# Patient Record
Sex: FEMALE | Race: WHITE | NOT HISPANIC OR LATINO | Employment: OTHER | URBAN - NONMETROPOLITAN AREA
[De-identification: names, ages, dates, MRNs, and addresses within clinical notes are randomized per-mention and may not be internally consistent; named-entity substitution may affect disease eponyms.]

---

## 2017-01-11 ENCOUNTER — OFFICE VISIT (OUTPATIENT)
Dept: CARDIOLOGY | Facility: CLINIC | Age: 82
End: 2017-01-11

## 2017-01-11 VITALS
HEIGHT: 65 IN | BODY MASS INDEX: 26.66 KG/M2 | SYSTOLIC BLOOD PRESSURE: 130 MMHG | OXYGEN SATURATION: 95 % | DIASTOLIC BLOOD PRESSURE: 60 MMHG | WEIGHT: 160 LBS | HEART RATE: 60 BPM

## 2017-01-11 DIAGNOSIS — I10 ESSENTIAL HYPERTENSION: ICD-10-CM

## 2017-01-11 DIAGNOSIS — I48.0 PAROXYSMAL ATRIAL FIBRILLATION (HCC): Primary | ICD-10-CM

## 2017-01-11 DIAGNOSIS — Z95.0 PACEMAKER: ICD-10-CM

## 2017-01-11 PROBLEM — F02.80 ALZHEIMER'S DEMENTIA (HCC): Status: ACTIVE | Noted: 2017-01-11

## 2017-01-11 PROBLEM — K21.9 GERD (GASTROESOPHAGEAL REFLUX DISEASE): Status: ACTIVE | Noted: 2017-01-11

## 2017-01-11 PROBLEM — G30.9 ALZHEIMER'S DEMENTIA (HCC): Status: ACTIVE | Noted: 2017-01-11

## 2017-01-11 PROCEDURE — 99213 OFFICE O/P EST LOW 20 MIN: CPT | Performed by: INTERNAL MEDICINE

## 2017-01-11 RX ORDER — GABAPENTIN 100 MG/1
100 CAPSULE ORAL TAKE AS DIRECTED
Refills: 5 | COMMUNITY
Start: 2016-12-08

## 2017-01-11 RX ORDER — MEMANTINE HYDROCHLORIDE 10 MG/1
10 TABLET ORAL 2 TIMES DAILY
Refills: 4 | COMMUNITY
Start: 2016-12-30

## 2017-01-11 RX ORDER — DONEPEZIL HYDROCHLORIDE 10 MG/1
10 TABLET, FILM COATED ORAL NIGHTLY
Refills: 4 | COMMUNITY
Start: 2016-12-17

## 2017-01-11 RX ORDER — SIMETHICONE 180 MG
180 CAPSULE ORAL 2 TIMES DAILY
COMMUNITY

## 2017-01-11 RX ORDER — WARFARIN SODIUM 2 MG/1
2 TABLET ORAL DAILY
Refills: 5 | COMMUNITY
Start: 2016-12-17

## 2017-01-11 RX ORDER — FUROSEMIDE 40 MG/1
40 TABLET ORAL DAILY
Refills: 2 | COMMUNITY
Start: 2016-12-29

## 2017-01-11 RX ORDER — QUETIAPINE FUMARATE 25 MG/1
50 TABLET, FILM COATED ORAL NIGHTLY
COMMUNITY
Start: 2017-01-08

## 2017-01-11 RX ORDER — SOTALOL HYDROCHLORIDE 120 MG/1
120 TABLET ORAL 2 TIMES DAILY
Refills: 4 | COMMUNITY
Start: 2016-12-25

## 2017-01-11 RX ORDER — METOPROLOL SUCCINATE 25 MG/1
25 TABLET, EXTENDED RELEASE ORAL 2 TIMES DAILY
Refills: 2 | COMMUNITY
Start: 2016-12-30

## 2017-01-11 RX ORDER — MEGESTROL ACETATE 40 MG/1
40 TABLET ORAL 2 TIMES DAILY
Refills: 2 | COMMUNITY
Start: 2017-01-06

## 2017-01-11 RX ORDER — SENNOSIDES 8.6 MG
650 CAPSULE ORAL 2 TIMES DAILY
COMMUNITY

## 2017-01-11 RX ORDER — OMEPRAZOLE 20 MG/1
20 CAPSULE, DELAYED RELEASE ORAL 2 TIMES DAILY
Refills: 5 | COMMUNITY
Start: 2017-01-02

## 2017-01-11 RX ORDER — POTASSIUM CHLORIDE 20 MEQ/1
20 TABLET, EXTENDED RELEASE ORAL DAILY
Refills: 5 | COMMUNITY
Start: 2016-12-14

## 2017-01-11 NOTE — LETTER
January 11, 2017     All Qureshi MD  [address] [place] [address]    Patient: Caitlin Xavier   YOB: 1928   Date of Visit: 1/11/2017       Dear Dr. Titus MD:    Caitlin Xavier was in my office today. Below is a copy of my note.    If you have questions, please do not hesitate to call me. I look forward to following Caitlin along with you.         Sincerely,        Anthony Krishnamurthy MD        CC: No Recipients      Reason for Visit: cardiovascular follow up.    HPI:  Caitlin Xavier is a 88 y.o. female is here today for 6 month follow-up.  She is a former patient of Dr. hart and transitioning care.    Previous Cardiac Testing and Procedures:  - Echo (05/16/2014) technically difficult study, EF 60%, mild AI    Patient Active Problem List   Diagnosis   • Paroxysmal atrial fibrillation   • Pacemaker   • Essential hypertension   • GERD (gastroesophageal reflux disease)       Social History   Substance Use Topics   • Smoking status: Never Smoker   • Smokeless tobacco: Never Used   • Alcohol use No       Family History   Problem Relation Age of Onset   • Heart disease Mother    • Hypertension Mother    • No Known Problems Father        The following portions of the patient's history were reviewed and updated as appropriate: allergies, current medications, past family history, past medical history, past social history, past surgical history and problem list.      Current Outpatient Prescriptions:   •  acetaminophen (TYLENOL) 650 MG 8 hr tablet, 650 mg 2 (Two) Times a Day., Disp: , Rfl:   •  Calcium Carb-Cholecalciferol (CALCIUM 600/VITAMIN D3 PO), Take  by mouth 2 (Two) Times a Day., Disp: , Rfl:   •  donepezil (ARICEPT) 10 MG tablet, 10 mg Every Night., Disp: , Rfl: 4  •  furosemide (LASIX) 40 MG tablet, 40 mg Daily. 1 and 1/2 tablet daily, Disp: , Rfl: 2  •  gabapentin (NEURONTIN) 100 MG capsule, 100 mg Take As Directed. 1 tablet am, and 2 pm, Disp: , Rfl: 5  •  megestrol (MEGACE) 40 MG tablet, 40 mg 2  "(Two) Times a Day., Disp: , Rfl: 2  •  memantine (NAMENDA) 10 MG tablet, 10 mg 2 (Two) Times a Day., Disp: , Rfl: 4  •  metoprolol succinate XL (TOPROL-XL) 25 MG 24 hr tablet, 25 mg 2 (Two) Times a Day., Disp: , Rfl: 2  •  Multiple Vitamin (MULTI VITAMIN DAILY PO), Take  by mouth Daily., Disp: , Rfl:   •  omeprazole (priLOSEC) 20 MG capsule, 20 mg 2 (Two) Times a Day., Disp: , Rfl: 5  •  potassium chloride (K-DUR,KLOR-CON) 20 MEQ CR tablet, 20 mEq Daily., Disp: , Rfl: 5  •  QUEtiapine (SEROquel) 25 MG tablet, 50 mg Every Night., Disp: , Rfl:   •  simethicone (MYLICON,GAS-X) 180 MG capsule, Take 180 mg by mouth 2 (Two) Times a Day., Disp: , Rfl:   •  sotalol (BETAPACE) 120 MG tablet, 120 mg 2 (Two) Times a Day., Disp: , Rfl: 4  •  warfarin (COUMADIN) 2 MG tablet, 2 mg Daily., Disp: , Rfl: 5    Review of Systems   Constitution: Positive for malaise/fatigue. Negative for chills, decreased appetite and fever.   HENT: Negative for congestion, headaches and nosebleeds.    Eyes: Negative for blurred vision and double vision.   Cardiovascular: Positive for leg swelling. Negative for chest pain, irregular heartbeat and palpitations.   Respiratory: Positive for cough. Negative for shortness of breath.    Hematologic/Lymphatic: Bruises/bleeds easily.   Skin: Negative for dry skin and rash.   Musculoskeletal: Positive for back pain. Negative for joint pain and neck pain.   Gastrointestinal: Negative for abdominal pain, constipation, heartburn and melena.   Genitourinary: Negative for dysuria and hematuria.   Neurological: Negative for dizziness, light-headedness and numbness.   Psychiatric/Behavioral: Negative for depression. The patient does not have insomnia and is not nervous/anxious.        Objective   Visit Vitals   • /60 (BP Location: Left arm, Patient Position: Sitting, Cuff Size: Adult)   • Pulse 60   • Ht 65\" (165.1 cm)   • Wt 160 lb (72.6 kg)  Comment: pt not able to stand   • SpO2 95%   • BMI 26.63 kg/m2 "     Physical Exam   Constitutional: She is oriented to person, place, and time. She appears well-developed and well-nourished.   HENT:   Head: Normocephalic and atraumatic.   Cardiovascular: Normal rate, regular rhythm and normal heart sounds.    No murmur heard.  Pulmonary/Chest: Effort normal and breath sounds normal.   Musculoskeletal: She exhibits no edema.   Neurological: She is alert and oriented to person, place, and time.   Skin: Skin is warm and dry.   Psychiatric: She has a normal mood and affect.     Procedures      ICD-10-CM ICD-9-CM   1. Paroxysmal atrial fibrillation I48.0 427.31   2. Pacemaker Z95.0 V45.01   3. Essential hypertension I10 401.9         Assessment/Plan:  1.  Paroxysmal atrial fibrillation: Managed on rhythm control with sotalol and Coumadin.    2.  Pacemaker in place: Initially placed for sick sinus syndrome, status post generator replacement June 2014.  Is due for pacemaker check.  Will schedule in pacemaker clinic.      3.  Hypertension: Well-controlled on current medications

## 2017-01-11 NOTE — MR AVS SNAPSHOT
Caitlin Xavier   1/11/2017 2:30 PM   Office Visit    Dept Phone:  [phone]   Encounter #:  [id number]    Provider:  Anthony Krishnamurthy MD   Department:  [facility]                Your Full Care Plan              Your Updated Medication List          This list is accurate as of: 1/11/17  3:41 PM.  Always use your most recent med list.                acetaminophen 650 MG 8 hr tablet   Commonly known as:  TYLENOL       CALCIUM 600/VITAMIN D3 PO       donepezil 10 MG tablet   Commonly known as:  ARICEPT       furosemide 40 MG tablet   Commonly known as:  LASIX       gabapentin 100 MG capsule   Commonly known as:  NEURONTIN       megestrol 40 MG tablet   Commonly known as:  MEGACE       memantine 10 MG tablet   Commonly known as:  NAMENDA       metoprolol succinate XL 25 MG 24 hr tablet   Commonly known as:  TOPROL-XL       MULTI VITAMIN DAILY PO       omeprazole 20 MG capsule   Commonly known as:  priLOSEC       potassium chloride 20 MEQ CR tablet   Commonly known as:  K-DUR,KLOR-CON       QUEtiapine 25 MG tablet   Commonly known as:  SEROquel       simethicone 180 MG capsule   Commonly known as:  MYLICON,GAS-X       sotalol 120 MG tablet   Commonly known as:  BETAPACE       warfarin 2 MG tablet   Commonly known as:  COUMADIN               You Were Diagnosed With        Codes Comments    Paroxysmal atrial fibrillation    -  Primary ICD-10-CM: I48.0  ICD-9-CM: 427.31     Pacemaker     ICD-10-CM: Z95.0  ICD-9-CM: V45.01     Essential hypertension     ICD-10-CM: I10  ICD-9-CM: 401.9       Instructions     None    Patient Instructions History      Upcoming Appointments     Visit Type Date Time Department    FOLLOW UP 1/11/2017  2:30 PM  [facility]    PACEMAKER CHECK 3/29/2017  2:15 PM [facility]    FOLLOW UP 7/13/2017  1:00 PM [facility]      [facility] Signup     [facility] allows you to send messages to your doctor, view your  "test results, renew your prescriptions, schedule appointments, and more. To sign up, go to [employer] and click on the Sign Up Now link in the New User? box. Enter your [employer] Activation Code exactly as it appears below along with the last four digits of your Social Security Number and your Date of Birth () to complete the sign-up process. If you do not sign up before the expiration date, you must request a new code.    [employer] Activation Code: [email or web address]  Expires: 2017  3:41 PM    If you have questions, you can email [email or web address] or call [phone] to talk to our [employer] staff. Remember, [employer] is NOT to be used for urgent needs. For medical emergencies, dial 911.               Other Info from Your Visit           Your Appointments     Mar 29, 2017  2:15 PM CDT   PACEMAKER CHECK with PACEMAKER CLINIC [facility] (--)    [address] [address] [phone]            2017  1:00 PM CDT   Follow Up with Anthony Krishnamurthy MD   [facility] (--)    [address] [address] [phone]           Arrive 15 minutes prior to appointment.              Allergies     Penicillins Allergy     Sulfa Antibiotics Allergy       Reason for Visit     Congestive Heart Failure     Hypertension     Atrial Fibrillation           Vital Signs     Blood Pressure Pulse Height Weight Oxygen Saturation Body Mass Index    130/60 (BP Location: Left arm, Patient Position: Sitting, Cuff Size: Adult) 60 65\" (165.1 cm) 160 lb (72.6 kg) 95% 26.63 kg/m2    Smoking Status                   Never Smoker           Problems and Diagnoses Noted     Alzheimer's disease    High blood pressure    Acid reflux disease    Pacemaker    Atrial fibrillation (irregular heartbeat)        "

## 2017-01-11 NOTE — PROGRESS NOTES
Reason for Visit: cardiovascular follow up.    HPI:  Caitlin Xavier is a 88 y.o. female is here today for 6 month follow-up.  She is a former patient of Dr. hart and transitioning care.  She has done well since her last visit with no current issues or complaints.  Her son notes that she spends most of the day sleeping.  Does not feel she has had her pacemaker interrogated in a while.    Previous Cardiac Testing and Procedures:  - Echo (05/16/2014) technically difficult study, EF 60%, mild AI    Patient Active Problem List   Diagnosis   • Paroxysmal atrial fibrillation   • Pacemaker   • Essential hypertension   • GERD (gastroesophageal reflux disease)       Social History   Substance Use Topics   • Smoking status: Never Smoker   • Smokeless tobacco: Never Used   • Alcohol use No       Family History   Problem Relation Age of Onset   • Heart disease Mother    • Hypertension Mother    • No Known Problems Father        The following portions of the patient's history were reviewed and updated as appropriate: allergies, current medications, past family history, past medical history, past social history, past surgical history and problem list.      Current Outpatient Prescriptions:   •  acetaminophen (TYLENOL) 650 MG 8 hr tablet, 650 mg 2 (Two) Times a Day., Disp: , Rfl:   •  Calcium Carb-Cholecalciferol (CALCIUM 600/VITAMIN D3 PO), Take  by mouth 2 (Two) Times a Day., Disp: , Rfl:   •  donepezil (ARICEPT) 10 MG tablet, 10 mg Every Night., Disp: , Rfl: 4  •  furosemide (LASIX) 40 MG tablet, 40 mg Daily. 1 and 1/2 tablet daily, Disp: , Rfl: 2  •  gabapentin (NEURONTIN) 100 MG capsule, 100 mg Take As Directed. 1 tablet am, and 2 pm, Disp: , Rfl: 5  •  megestrol (MEGACE) 40 MG tablet, 40 mg 2 (Two) Times a Day., Disp: , Rfl: 2  •  memantine (NAMENDA) 10 MG tablet, 10 mg 2 (Two) Times a Day., Disp: , Rfl: 4  •  metoprolol succinate XL (TOPROL-XL) 25 MG 24 hr tablet, 25 mg 2 (Two) Times a Day., Disp: , Rfl: 2  •  Multiple  "Vitamin (MULTI VITAMIN DAILY PO), Take  by mouth Daily., Disp: , Rfl:   •  omeprazole (priLOSEC) 20 MG capsule, 20 mg 2 (Two) Times a Day., Disp: , Rfl: 5  •  potassium chloride (K-DUR,KLOR-CON) 20 MEQ CR tablet, 20 mEq Daily., Disp: , Rfl: 5  •  QUEtiapine (SEROquel) 25 MG tablet, 50 mg Every Night., Disp: , Rfl:   •  simethicone (MYLICON,GAS-X) 180 MG capsule, Take 180 mg by mouth 2 (Two) Times a Day., Disp: , Rfl:   •  sotalol (BETAPACE) 120 MG tablet, 120 mg 2 (Two) Times a Day., Disp: , Rfl: 4  •  warfarin (COUMADIN) 2 MG tablet, 2 mg Daily., Disp: , Rfl: 5    Review of Systems   Constitution: Positive for malaise/fatigue. Negative for chills, decreased appetite and fever.   HENT: Negative for congestion, headaches and nosebleeds.    Eyes: Negative for blurred vision and double vision.   Cardiovascular: Positive for leg swelling. Negative for chest pain, irregular heartbeat and palpitations.   Respiratory: Positive for cough. Negative for shortness of breath.    Hematologic/Lymphatic: Bruises/bleeds easily.   Skin: Negative for dry skin and rash.   Musculoskeletal: Positive for back pain. Negative for joint pain and neck pain.   Gastrointestinal: Negative for abdominal pain, constipation, heartburn and melena.   Genitourinary: Negative for dysuria and hematuria.   Neurological: Negative for dizziness, light-headedness and numbness.   Psychiatric/Behavioral: Negative for depression. The patient does not have insomnia and is not nervous/anxious.        Objective   Visit Vitals   • /60 (BP Location: Left arm, Patient Position: Sitting, Cuff Size: Adult)   • Pulse 60   • Ht 65\" (165.1 cm)   • Wt 160 lb (72.6 kg)  Comment: pt not able to stand   • SpO2 95%   • BMI 26.63 kg/m2     Physical Exam   Constitutional: She is oriented to person, place, and time. She appears well-developed and well-nourished.   HENT:   Head: Normocephalic and atraumatic.   Cardiovascular: Normal rate, regular rhythm and normal heart " sounds.    No murmur heard.  Pulmonary/Chest: Effort normal and breath sounds normal.   Musculoskeletal: She exhibits no edema.   Neurological: She is alert and oriented to person, place, and time.   Skin: Skin is warm and dry.   Psychiatric: She has a normal mood and affect.     Procedures      ICD-10-CM ICD-9-CM   1. Paroxysmal atrial fibrillation I48.0 427.31   2. Pacemaker Z95.0 V45.01   3. Essential hypertension I10 401.9         Assessment/Plan:  1.  Paroxysmal atrial fibrillation: Managed on rhythm control with sotalol and Coumadin.    2.  Pacemaker in place: Initially placed for sick sinus syndrome, status post generator replacement June 2014.  Is due for pacemaker check.  Will schedule in pacemaker clinic.      3.  Hypertension: Well-controlled on current medications

## 2017-03-29 ENCOUNTER — CLINICAL SUPPORT (OUTPATIENT)
Dept: CARDIOLOGY | Facility: CLINIC | Age: 82
End: 2017-03-29

## 2017-03-29 DIAGNOSIS — I48.0 PAROXYSMAL ATRIAL FIBRILLATION (HCC): ICD-10-CM

## 2017-03-29 DIAGNOSIS — Z95.0 PACEMAKER: ICD-10-CM

## 2017-03-29 PROCEDURE — 93288 INTERROG EVL PM/LDLS PM IP: CPT | Performed by: NURSE PRACTITIONER

## 2017-03-29 NOTE — PROGRESS NOTES
Pacemaker Evaluation Report    March 29, 2017    Indication for pacemaker: sinus node dysfunction - tachy/sky    Implanting MD: Dr. Harsh Landaverde    : Medtronic  Model: Adapta [id number]    Implant Date: 6/17/2014 (generator change), original implant 4/6/2005    Reason For Evaluation: routine      DIAGNOSTIC DATA    Atrial paced: 92% Ventricular paced: 0.1%    Battery status: satisfactory  Voltage: 2.79 V  Estimated battery life: 10 years      FINAL PARAMETERS    Changes made: none    Conclusions: normal pacemaker function, stable pacing and sensing thresholds and adequate battery reserve    Return in about 6 months (around 9/29/2017) for Pacemaker Clinic.        Maria Luisa Ziegler, APRN, ACNP-BC, CHFN-BC

## 2017-07-13 ENCOUNTER — OFFICE VISIT (OUTPATIENT)
Dept: CARDIOLOGY | Facility: CLINIC | Age: 82
End: 2017-07-13

## 2017-07-13 VITALS
OXYGEN SATURATION: 95 % | SYSTOLIC BLOOD PRESSURE: 108 MMHG | HEIGHT: 65 IN | HEART RATE: 63 BPM | DIASTOLIC BLOOD PRESSURE: 72 MMHG

## 2017-07-13 DIAGNOSIS — I10 ESSENTIAL HYPERTENSION: ICD-10-CM

## 2017-07-13 DIAGNOSIS — Z95.0 PACEMAKER: ICD-10-CM

## 2017-07-13 DIAGNOSIS — G30.9 ALZHEIMER'S DEMENTIA WITHOUT BEHAVIORAL DISTURBANCE, UNSPECIFIED TIMING OF DEMENTIA ONSET: ICD-10-CM

## 2017-07-13 DIAGNOSIS — I48.0 PAROXYSMAL ATRIAL FIBRILLATION (HCC): Primary | ICD-10-CM

## 2017-07-13 DIAGNOSIS — F02.80 ALZHEIMER'S DEMENTIA WITHOUT BEHAVIORAL DISTURBANCE, UNSPECIFIED TIMING OF DEMENTIA ONSET: ICD-10-CM

## 2017-07-13 PROCEDURE — 99213 OFFICE O/P EST LOW 20 MIN: CPT | Performed by: INTERNAL MEDICINE

## 2017-07-13 NOTE — PROGRESS NOTES
Reason for Visit: cardiovascular follow up.    HPI:  Caitlin Xavier is a 89 y.o. female is here today for 6 month follow-up.  Her pacemaker was interrogated in March and functioning normal.  She had a questionable syncopal episode about a month ago while she was getting a bath and her body went limp.  She felt fine shortly after the episode.  She has had no past episodes since then.  She recently bumped her left arm while getting out of the vehicle and tore the skin on her arm.     Previous Cardiac Testing and Procedures:  - Echo (05/16/2014) technically difficult study, EF 60%, mild AI    Patient Active Problem List   Diagnosis   • Paroxysmal atrial fibrillation   • Pacemaker   • Essential hypertension   • GERD (gastroesophageal reflux disease)   • Alzheimer's dementia       Social History   Substance Use Topics   • Smoking status: Never Smoker   • Smokeless tobacco: Never Used   • Alcohol use No       Family History   Problem Relation Age of Onset   • Heart disease Mother    • Hypertension Mother    • No Known Problems Father        The following portions of the patient's history were reviewed and updated as appropriate: allergies, current medications, past family history, past medical history, past social history, past surgical history and problem list.      Current Outpatient Prescriptions:   •  acetaminophen (TYLENOL) 650 MG 8 hr tablet, 650 mg 2 (Two) Times a Day., Disp: , Rfl:   •  Calcium Carb-Cholecalciferol (CALCIUM 600/VITAMIN D3 PO), Take  by mouth 2 (Two) Times a Day., Disp: , Rfl:   •  donepezil (ARICEPT) 10 MG tablet, 10 mg Every Night., Disp: , Rfl: 4  •  furosemide (LASIX) 40 MG tablet, 40 mg Daily. 1 and 1/2 tablet daily, Disp: , Rfl: 2  •  gabapentin (NEURONTIN) 100 MG capsule, 100 mg Take As Directed. 1 tablet am, and 2 pm, Disp: , Rfl: 5  •  megestrol (MEGACE) 40 MG tablet, 40 mg 2 (Two) Times a Day., Disp: , Rfl: 2  •  memantine (NAMENDA) 10 MG tablet, 10 mg 2 (Two) Times a Day., Disp: ,  "Rfl: 4  •  metoprolol succinate XL (TOPROL-XL) 25 MG 24 hr tablet, 25 mg 2 (Two) Times a Day., Disp: , Rfl: 2  •  Multiple Vitamin (MULTI VITAMIN DAILY PO), Take  by mouth Daily., Disp: , Rfl:   •  omeprazole (priLOSEC) 20 MG capsule, 20 mg 2 (Two) Times a Day., Disp: , Rfl: 5  •  potassium chloride (K-DUR,KLOR-CON) 20 MEQ CR tablet, 20 mEq Daily., Disp: , Rfl: 5  •  QUEtiapine (SEROquel) 25 MG tablet, 50 mg Every Night., Disp: , Rfl:   •  simethicone (MYLICON,GAS-X) 180 MG capsule, Take 180 mg by mouth 2 (Two) Times a Day., Disp: , Rfl:   •  sotalol (BETAPACE) 120 MG tablet, 120 mg 2 (Two) Times a Day., Disp: , Rfl: 4  •  warfarin (COUMADIN) 2 MG tablet, 2 mg Daily., Disp: , Rfl: 5    Review of Systems   Constitution: Positive for malaise/fatigue. Negative for chills, decreased appetite and fever.   HENT: Negative for congestion, headaches and nosebleeds.    Eyes: Negative for blurred vision and double vision.   Cardiovascular: Positive for leg swelling. Negative for chest pain, irregular heartbeat and palpitations.   Respiratory: Positive for cough. Negative for shortness of breath.    Hematologic/Lymphatic: Bruises/bleeds easily.   Skin: Negative for dry skin and rash.   Musculoskeletal: Positive for back pain. Negative for joint pain and neck pain.   Gastrointestinal: Negative for abdominal pain, constipation, heartburn and melena.   Genitourinary: Negative for dysuria and hematuria.   Neurological: Negative for dizziness, light-headedness and numbness.   Psychiatric/Behavioral: Negative for depression. The patient does not have insomnia and is not nervous/anxious.        Objective   /72 (BP Location: Left arm, Patient Position: Sitting, Cuff Size: Adult)  Pulse 63  Ht 65\" (165.1 cm)  SpO2 95%  Physical Exam   Constitutional: She is oriented to person, place, and time. She appears well-developed and well-nourished.   HENT:   Head: Normocephalic and atraumatic.   Cardiovascular: Normal rate, regular " rhythm and normal heart sounds.    No murmur heard.  Pulmonary/Chest: Effort normal and breath sounds normal.   Musculoskeletal: She exhibits no edema.   Neurological: She is alert and oriented to person, place, and time.   Skin: Skin is warm and dry. Lesion (left elbow lesion) noted.   Psychiatric: She has a normal mood and affect.     Procedures      ICD-10-CM ICD-9-CM   1. Paroxysmal atrial fibrillation I48.0 427.31   2. Pacemaker Z95.0 V45.01   3. Essential hypertension I10 401.9   4. Alzheimer's dementia without behavioral disturbance, unspecified timing of dementia onset G30.9 331.0    F02.80 294.10         Assessment/Plan:  1. Paroxysmal atrial fibrillation: Stable.  Continue rhythm control with sotalol and Coumadin.     2. Pacemaker in place: Placed for sick sinus syndrome, status post generator replacement June 2014.  Normal function on last interrogation from 3/2017.        3. Hypertension: Remains well-controlled on current medications    4. Dementia:  Appears relatively stable.  Discussed goals of care.

## 2017-09-12 ENCOUNTER — TELEPHONE (OUTPATIENT)
Dept: CARDIOLOGY | Facility: CLINIC | Age: 82
End: 2017-09-12

## 2017-09-12 NOTE — TELEPHONE ENCOUNTER
Mrs. Xavier's son Oswaldo called about his mom's pacemaker.  He and others had heard something about a pacemaker recall and wondered if it involved Mrs. Xavier's pacer.  He thought the problem was something about when the pacer kicked in it wouldn't stop.

## 2017-09-13 NOTE — TELEPHONE ENCOUNTER
Returned son's phone call after checking with [employer] reps - no recalls on any of their devices. Advised him of pt's upcoming appts.

## 2017-11-13 ENCOUNTER — CLINICAL SUPPORT (OUTPATIENT)
Dept: CARDIOLOGY | Facility: CLINIC | Age: 82
End: 2017-11-13

## 2017-11-13 DIAGNOSIS — Z95.0 PACEMAKER: ICD-10-CM

## 2017-11-13 PROCEDURE — 93288 INTERROG EVL PM/LDLS PM IP: CPT | Performed by: NURSE PRACTITIONER

## 2017-11-13 NOTE — PROGRESS NOTES
Pacemaker Evaluation Report    November 13, 2017    Indication for pacemaker: sinus node dysfunction - tachy/sky    Implanting MD: Dr. Harsh Landaverde    : [employer]tronic  Model: Adapta [id number]    Implant Date: 6/17/2014 (generator change), original implant 4/6/2005    Reason For Evaluation: routine      DIAGNOSTIC DATA    Atrial paced: 95.6%  Ventricular paced: less than 0.1%    Battery status: satisfactory  Voltage: 2.79 V  Estimated battery life: 9.5 years      FINAL PARAMETERS    Changes made: none    Conclusions: normal pacemaker function, stable pacing and sensing thresholds and adequate battery reserve    Return in about 6 months (around 5/13/2018) for Pacemaker Clinic.        Maria Luisa Ziegler, APRN, ACNP-BC, CHFN-BC

## 2018-03-07 ENCOUNTER — OFFICE VISIT (OUTPATIENT)
Dept: CARDIOLOGY | Facility: CLINIC | Age: 83
End: 2018-03-07

## 2018-03-07 VITALS
BODY MASS INDEX: 33.32 KG/M2 | WEIGHT: 200 LBS | OXYGEN SATURATION: 95 % | HEART RATE: 62 BPM | SYSTOLIC BLOOD PRESSURE: 120 MMHG | HEIGHT: 65 IN | DIASTOLIC BLOOD PRESSURE: 60 MMHG

## 2018-03-07 DIAGNOSIS — I48.0 PAROXYSMAL ATRIAL FIBRILLATION (HCC): Primary | ICD-10-CM

## 2018-03-07 DIAGNOSIS — I10 ESSENTIAL HYPERTENSION: ICD-10-CM

## 2018-03-07 DIAGNOSIS — Z95.0 PACEMAKER: ICD-10-CM

## 2018-03-07 PROBLEM — I49.5 SSS (SICK SINUS SYNDROME) (HCC): Status: ACTIVE | Noted: 2018-03-07

## 2018-03-07 PROCEDURE — 93000 ELECTROCARDIOGRAM COMPLETE: CPT | Performed by: INTERNAL MEDICINE

## 2018-03-07 PROCEDURE — 99213 OFFICE O/P EST LOW 20 MIN: CPT | Performed by: INTERNAL MEDICINE

## 2018-03-07 RX ORDER — CETIRIZINE HYDROCHLORIDE 10 MG/1
10 TABLET ORAL DAILY
Refills: 5 | COMMUNITY
Start: 2018-01-12

## 2018-03-07 NOTE — PROGRESS NOTES
Reason for Visit: cardiovascular follow up.    HPI:  Caitlin Xavier is a 89 y.o. female is here today for 6 month follow-up.  She has not had any new issues or complaints.  Family notes that she spends much of the time sleeping.  Dementia seems relatively stable.  Has no other new complaints or issues.      Previous Cardiac Testing and Procedures:  - Echo (05/16/2014) technically difficult study, EF 60%, mild AI    Patient Active Problem List   Diagnosis   • Paroxysmal atrial fibrillation   • Pacemaker   • Essential hypertension   • GERD (gastroesophageal reflux disease)   • Alzheimer's dementia   • SSS (sick sinus syndrome)       Social History   Substance Use Topics   • Smoking status: Never Smoker   • Smokeless tobacco: Never Used   • Alcohol use No       Family History   Problem Relation Age of Onset   • Heart disease Mother    • Hypertension Mother    • No Known Problems Father        The following portions of the patient's history were reviewed and updated as appropriate: allergies, current medications, past family history, past medical history, past social history, past surgical history and problem list.      Current Outpatient Prescriptions:   •  acetaminophen (TYLENOL) 650 MG 8 hr tablet, 650 mg 2 (Two) Times a Day., Disp: , Rfl:   •  Calcium Carb-Cholecalciferol (CALCIUM 600/VITAMIN D3 PO), Take  by mouth 2 (Two) Times a Day., Disp: , Rfl:   •  cetirizine (zyrTEC) 10 MG tablet, Take 10 mg by mouth Daily., Disp: , Rfl: 5  •  donepezil (ARICEPT) 10 MG tablet, 10 mg Every Night., Disp: , Rfl: 4  •  furosemide (LASIX) 40 MG tablet, 40 mg Daily. 1 and 1/2 tablet daily, Disp: , Rfl: 2  •  gabapentin (NEURONTIN) 100 MG capsule, 100 mg Take As Directed. 1 tablet am, and 2 pm, Disp: , Rfl: 5  •  megestrol (MEGACE) 40 MG tablet, 40 mg 2 (Two) Times a Day., Disp: , Rfl: 2  •  memantine (NAMENDA) 10 MG tablet, 10 mg 2 (Two) Times a Day., Disp: , Rfl: 4  •  metoprolol succinate XL (TOPROL-XL) 25 MG 24 hr tablet, 25  "mg 2 (Two) Times a Day., Disp: , Rfl: 2  •  Multiple Vitamin (MULTI VITAMIN DAILY PO), Take  by mouth Daily., Disp: , Rfl:   •  omeprazole (priLOSEC) 20 MG capsule, 20 mg 2 (Two) Times a Day., Disp: , Rfl: 5  •  potassium chloride (K-DUR,KLOR-CON) 20 MEQ CR tablet, 20 mEq Daily., Disp: , Rfl: 5  •  QUEtiapine (SEROquel) 25 MG tablet, 50 mg Every Night., Disp: , Rfl:   •  simethicone (MYLICON,GAS-X) 180 MG capsule, Take 180 mg by mouth 2 (Two) Times a Day., Disp: , Rfl:   •  sotalol (BETAPACE) 120 MG tablet, 120 mg 2 (Two) Times a Day., Disp: , Rfl: 4  •  warfarin (COUMADIN) 2 MG tablet, 2 mg Daily., Disp: , Rfl: 5    Review of Systems   Constitution: Positive for malaise/fatigue. Negative for chills, decreased appetite and fever.   HENT: Negative for congestion and nosebleeds.    Eyes: Negative for blurred vision and double vision.   Cardiovascular: Positive for leg swelling. Negative for chest pain, irregular heartbeat and palpitations.   Respiratory: Positive for cough. Negative for shortness of breath.    Hematologic/Lymphatic: Bruises/bleeds easily.   Skin: Negative for dry skin and rash.   Musculoskeletal: Positive for back pain. Negative for joint pain and neck pain.   Gastrointestinal: Negative for abdominal pain, constipation, heartburn and melena.   Genitourinary: Negative for dysuria and hematuria.   Neurological: Negative for dizziness, headaches, light-headedness and numbness.   Psychiatric/Behavioral: Negative for depression. The patient does not have insomnia and is not nervous/anxious.        Objective   /60 (BP Location: Left arm, Patient Position: Sitting, Cuff Size: Adult)  Pulse 62  Ht 165.1 cm (65\")  Wt 90.7 kg (200 lb)  SpO2 95%  BMI 33.28 kg/m2  Physical Exam   Constitutional: She is oriented to person, place, and time. She appears well-developed and well-nourished.   HENT:   Head: Normocephalic and atraumatic.   Cardiovascular: Normal rate, regular rhythm and normal heart sounds.  "   No murmur heard.  Pulmonary/Chest: Effort normal and breath sounds normal.   Musculoskeletal: She exhibits no edema.   Neurological: She is alert and oriented to person, place, and time.   Skin: Skin is warm and dry.   Psychiatric: She has a normal mood and affect.       ECG 12 Lead  Date/Time: 3/7/2018 2:05 PM  Performed by: EHSAN ROMERO  Authorized by: EHSAN ROMERO   Comparison: compared with previous ECG from 6/17/2014  Comparison to previous ECG: Atrial paced rhythm has replaced ventricular paced rhythm  Rhythm comments: atrial paced rhythm  Other findings comments: baseline artifact, nonspecific ST changes                ICD-10-CM ICD-9-CM   1. Paroxysmal atrial fibrillation I48.0 427.31   2. Pacemaker Z95.0 V45.01   3. Essential hypertension I10 401.9         Assessment/Plan:  1. Paroxysmal atrial fibrillation:   Managed on  rhythm control with sotalol and Coumadin.      2. Pacemaker in place: Placed for sick sinus syndrome.  Normal function on interrogation from November 2017.  Continue routine surveillance.          3. Hypertension: Blood pressure remains well-controlled on current therapy.

## 2018-05-14 ENCOUNTER — CLINICAL SUPPORT (OUTPATIENT)
Dept: CARDIOLOGY | Facility: CLINIC | Age: 83
End: 2018-05-14

## 2018-05-14 DIAGNOSIS — I49.5 SSS (SICK SINUS SYNDROME) (HCC): ICD-10-CM

## 2018-05-14 DIAGNOSIS — Z95.0 PACEMAKER: ICD-10-CM

## 2018-05-14 PROCEDURE — 93288 INTERROG EVL PM/LDLS PM IP: CPT | Performed by: NURSE PRACTITIONER

## 2018-05-14 NOTE — PROGRESS NOTES
Pacemaker Evaluation Report    May 14, 2018    Indication for pacemaker: sinus node dysfunction - tachy/sky    Implanting MD: Dr. Harsh Landaverde    : [employer]  Model: Adapta [id number]    Implant Date: 6/17/2014 (generator change), original implant 4/6/2005    Reason For Evaluation: routine      DIAGNOSTIC DATA    Atrial paced: 97.7%  Ventricular paced: less than 0.1%    Battery status: satisfactory  Voltage: 2.79 V  Estimated battery life: 8.5 years      FINAL PARAMETERS    Changes made: decreased ventricular output to 3.0 V from 3.5 V    Conclusions: normal pacemaker function, stable pacing and sensing thresholds and adequate battery reserve    Return in about 6 months (around 11/14/2018) for Pacemaker Clinic.        Maria Luisa Ziegler APRN, ACNP-BC, CHFN-BC